# Patient Record
Sex: FEMALE | Race: WHITE | HISPANIC OR LATINO | Employment: OTHER | URBAN - METROPOLITAN AREA
[De-identification: names, ages, dates, MRNs, and addresses within clinical notes are randomized per-mention and may not be internally consistent; named-entity substitution may affect disease eponyms.]

---

## 2017-01-24 ENCOUNTER — HISTORICAL (OUTPATIENT)
Dept: ADMINISTRATIVE | Facility: HOSPITAL | Age: 27
End: 2017-01-24

## 2017-04-12 ENCOUNTER — HISTORICAL (OUTPATIENT)
Dept: ADMINISTRATIVE | Facility: HOSPITAL | Age: 27
End: 2017-04-12

## 2017-06-22 ENCOUNTER — HISTORICAL (OUTPATIENT)
Dept: ADMINISTRATIVE | Facility: HOSPITAL | Age: 27
End: 2017-06-22

## 2017-07-18 ENCOUNTER — HISTORICAL (OUTPATIENT)
Dept: ADMINISTRATIVE | Facility: HOSPITAL | Age: 27
End: 2017-07-18

## 2017-07-18 LAB
ALBUMIN SERPL-MCNC: 3.3 GM/DL (ref 3.4–5)
ALBUMIN/GLOB SERPL: 0.8 {RATIO}
ALP SERPL-CCNC: 70 UNIT/L (ref 38–126)
ALT SERPL-CCNC: 16 UNIT/L (ref 12–78)
AST SERPL-CCNC: 11 UNIT/L (ref 15–37)
BILIRUB SERPL-MCNC: 0.3 MG/DL (ref 0.2–1)
BILIRUBIN DIRECT+TOT PNL SERPL-MCNC: 0.1 MG/DL (ref 0–0.2)
BILIRUBIN DIRECT+TOT PNL SERPL-MCNC: 0.2 MG/DL (ref 0–0.8)
BUN SERPL-MCNC: 7 MG/DL (ref 7–18)
CALCIUM SERPL-MCNC: 8.7 MG/DL (ref 8.5–10.1)
CHLORIDE SERPL-SCNC: 113 MMOL/L (ref 98–107)
CO2 SERPL-SCNC: 26 MMOL/L (ref 21–32)
CREAT SERPL-MCNC: 0.83 MG/DL (ref 0.55–1.02)
ERYTHROCYTE [DISTWIDTH] IN BLOOD BY AUTOMATED COUNT: 15.9 % (ref 11.5–17)
GLOBULIN SER-MCNC: 3.9 GM/DL (ref 2.4–3.5)
GLUCOSE SERPL-MCNC: 85 MG/DL (ref 74–106)
HCT VFR BLD AUTO: 36.1 % (ref 37–47)
HGB BLD-MCNC: 10.4 GM/DL (ref 12–16)
LITHIUM SERPL-MCNC: 0.92 MMOL/L (ref 0.6–1.2)
MCH RBC QN AUTO: 25.2 PG (ref 27–31)
MCHC RBC AUTO-ENTMCNC: 28.8 GM/DL (ref 33–36)
MCV RBC AUTO: 87.6 FL (ref 80–94)
PLATELET # BLD AUTO: 367 X10(3)/MCL (ref 130–400)
PMV BLD AUTO: 10.4 FL (ref 9.4–12.4)
POTASSIUM SERPL-SCNC: 4.3 MMOL/L (ref 3.5–5.1)
PROT SERPL-MCNC: 7.2 GM/DL (ref 6.4–8.2)
RBC # BLD AUTO: 4.12 X10(6)/MCL (ref 4.2–5.4)
SODIUM SERPL-SCNC: 146 MMOL/L (ref 136–145)
TSH SERPL-ACNC: 3.05 MIU/ML (ref 0.36–3.74)
WBC # SPEC AUTO: 8.2 X10(3)/MCL (ref 4.5–11.5)

## 2017-11-16 ENCOUNTER — HISTORICAL (OUTPATIENT)
Dept: ADMINISTRATIVE | Facility: HOSPITAL | Age: 27
End: 2017-11-16

## 2017-11-16 LAB
ABS NEUT (OLG): 4.85 X10(3)/MCL (ref 2.1–9.2)
ALBUMIN SERPL-MCNC: 3.6 GM/DL (ref 3.4–5)
ALBUMIN/GLOB SERPL: 0.9 RATIO (ref 1.1–2)
ALP SERPL-CCNC: 83 UNIT/L (ref 38–126)
ALT SERPL-CCNC: 15 UNIT/L (ref 12–78)
AST SERPL-CCNC: 11 UNIT/L (ref 15–37)
BASOPHILS # BLD AUTO: 0 X10(3)/MCL (ref 0–0.2)
BASOPHILS NFR BLD AUTO: 0 %
BILIRUB SERPL-MCNC: 0.1 MG/DL (ref 0.2–1)
BILIRUBIN DIRECT+TOT PNL SERPL-MCNC: 0 MG/DL (ref 0–0.5)
BILIRUBIN DIRECT+TOT PNL SERPL-MCNC: 0.1 MG/DL (ref 0–0.8)
BUN SERPL-MCNC: 6 MG/DL (ref 7–18)
CALCIUM SERPL-MCNC: 9.2 MG/DL (ref 8.5–10.1)
CHLORIDE SERPL-SCNC: 111 MMOL/L (ref 98–107)
CO2 SERPL-SCNC: 24 MMOL/L (ref 21–32)
CREAT SERPL-MCNC: 0.9 MG/DL (ref 0.55–1.02)
EOSINOPHIL # BLD AUTO: 0.2 X10(3)/MCL (ref 0–0.9)
EOSINOPHIL NFR BLD AUTO: 3 %
ERYTHROCYTE [DISTWIDTH] IN BLOOD BY AUTOMATED COUNT: 15.4 % (ref 11.5–17)
GLOBULIN SER-MCNC: 4.1 GM/DL (ref 2.4–3.5)
GLUCOSE SERPL-MCNC: 127 MG/DL (ref 74–106)
HCT VFR BLD AUTO: 39.2 % (ref 37–47)
HGB BLD-MCNC: 11.5 GM/DL (ref 12–16)
LITHIUM SERPL-MCNC: 0.65 MMOL/L (ref 0.6–1.2)
LYMPHOCYTES # BLD AUTO: 2.2 X10(3)/MCL (ref 0.6–4.6)
LYMPHOCYTES NFR BLD AUTO: 29 %
MCH RBC QN AUTO: 27.1 PG (ref 27–31)
MCHC RBC AUTO-ENTMCNC: 29.3 GM/DL (ref 33–36)
MCV RBC AUTO: 92.2 FL (ref 80–94)
MONOCYTES # BLD AUTO: 0.4 X10(3)/MCL (ref 0.1–1.3)
MONOCYTES NFR BLD AUTO: 5 %
NEUTROPHILS # BLD AUTO: 4.85 X10(3)/MCL (ref 1.4–7.9)
NEUTROPHILS NFR BLD AUTO: 63 %
PLATELET # BLD AUTO: 337 X10(3)/MCL (ref 130–400)
PMV BLD AUTO: 10.1 FL (ref 9.4–12.4)
POTASSIUM SERPL-SCNC: 4.1 MMOL/L (ref 3.5–5.1)
PROT SERPL-MCNC: 7.7 GM/DL (ref 6.4–8.2)
RBC # BLD AUTO: 4.25 X10(6)/MCL (ref 4.2–5.4)
SODIUM SERPL-SCNC: 144 MMOL/L (ref 136–145)
T3RU NFR SERPL: 21 % (ref 31–39)
T4 FREE SERPL-MCNC: 0.8 NG/DL (ref 0.76–1.46)
TSH SERPL-ACNC: 3.42 MIU/ML (ref 0.36–3.74)
WBC # SPEC AUTO: 7.7 X10(3)/MCL (ref 4.5–11.5)

## 2018-06-07 ENCOUNTER — HISTORICAL (OUTPATIENT)
Dept: ADMINISTRATIVE | Facility: HOSPITAL | Age: 28
End: 2018-06-07

## 2018-06-07 LAB
ABS NEUT (OLG): 6.85 X10(3)/MCL (ref 2.1–9.2)
ALBUMIN SERPL-MCNC: 3.7 GM/DL (ref 3.4–5)
ALBUMIN/GLOB SERPL: 0.8 RATIO (ref 1.1–2)
ALP SERPL-CCNC: 64 UNIT/L (ref 38–126)
ALT SERPL-CCNC: 41 UNIT/L (ref 12–78)
AST SERPL-CCNC: 31 UNIT/L (ref 15–37)
BASOPHILS # BLD AUTO: 0 X10(3)/MCL (ref 0–0.2)
BASOPHILS NFR BLD AUTO: 0 %
BILIRUB SERPL-MCNC: 0.4 MG/DL (ref 0.2–1)
BILIRUBIN DIRECT+TOT PNL SERPL-MCNC: 0.1 MG/DL (ref 0–0.5)
BILIRUBIN DIRECT+TOT PNL SERPL-MCNC: 0.3 MG/DL (ref 0–0.8)
BUN SERPL-MCNC: 11 MG/DL (ref 7–18)
CALCIUM SERPL-MCNC: 10.1 MG/DL (ref 8.5–10.1)
CHLORIDE SERPL-SCNC: 108 MMOL/L (ref 98–107)
CO2 SERPL-SCNC: 26 MMOL/L (ref 21–32)
CREAT SERPL-MCNC: 0.91 MG/DL (ref 0.55–1.02)
EOSINOPHIL # BLD AUTO: 0.2 X10(3)/MCL (ref 0–0.9)
EOSINOPHIL NFR BLD AUTO: 2 %
ERYTHROCYTE [DISTWIDTH] IN BLOOD BY AUTOMATED COUNT: 14.2 % (ref 11.5–17)
GLOBULIN SER-MCNC: 4.5 GM/DL (ref 2.4–3.5)
GLUCOSE SERPL-MCNC: 84 MG/DL (ref 74–106)
HCT VFR BLD AUTO: 40.6 % (ref 37–47)
HGB BLD-MCNC: 12 GM/DL (ref 12–16)
LITHIUM SERPL-MCNC: 0.56 MMOL/L (ref 0.6–1.2)
LYMPHOCYTES # BLD AUTO: 2 X10(3)/MCL (ref 0.6–4.6)
LYMPHOCYTES NFR BLD AUTO: 21 %
MCH RBC QN AUTO: 26.7 PG (ref 27–31)
MCHC RBC AUTO-ENTMCNC: 29.6 GM/DL (ref 33–36)
MCV RBC AUTO: 90.4 FL (ref 80–94)
MONOCYTES # BLD AUTO: 0.5 X10(3)/MCL (ref 0.1–1.3)
MONOCYTES NFR BLD AUTO: 5 %
NEUTROPHILS # BLD AUTO: 6.85 X10(3)/MCL (ref 2.1–9.2)
NEUTROPHILS NFR BLD AUTO: 71 %
PLATELET # BLD AUTO: 368 X10(3)/MCL (ref 130–400)
PMV BLD AUTO: 10.1 FL (ref 9.4–12.4)
POTASSIUM SERPL-SCNC: 4.8 MMOL/L (ref 3.5–5.1)
PROT SERPL-MCNC: 8.2 GM/DL (ref 6.4–8.2)
RBC # BLD AUTO: 4.49 X10(6)/MCL (ref 4.2–5.4)
SODIUM SERPL-SCNC: 143 MMOL/L (ref 136–145)
T3RU NFR SERPL: 31 % (ref 31–39)
T4 FREE SERPL-MCNC: 0.95 NG/DL (ref 0.76–1.46)
TSH SERPL-ACNC: 2.4 MIU/L (ref 0.36–3.74)
WBC # SPEC AUTO: 9.6 X10(3)/MCL (ref 4.5–11.5)

## 2020-05-18 ENCOUNTER — TELEPHONE (OUTPATIENT)
Dept: OBSTETRICS AND GYNECOLOGY | Facility: CLINIC | Age: 30
End: 2020-05-18

## 2020-05-18 NOTE — TELEPHONE ENCOUNTER
----- Message from Migdalia Stover sent at 5/18/2020 12:54 PM CDT -----  Contact: pt   Yes, since, she never actually saw him.   ----- Message -----  From: Rex Pinedo  Sent: 5/18/2020  11:25 AM CDT  To: Migdalia Stover    This patient had an appt with Dr. Mcleod on January 14, 2020 at 8:45 that was canceled due to car trouble.      Now she is wanting to see Dr. Shelley.  Her first available would not be until June 1st.    Do I switch providers for her?      ----- Message -----  From: Migdalia Dowson  Sent: 5/18/2020  11:07 AM CDT  To: Rex Pinedo        ----- Message -----  From: Nasima Gong  Sent: 5/18/2020  10:59 AM CDT  To: Karsten Henson ([facility]) Staff    Name of Caller  Pt   Reason for Visit/Symptoms annual and cervical cancer check   Best Contact Number or Confirm if Mychart Preferred phone [phone]  Preferred Date/Time of Appointment 05/27 around 10 am  Interested in Virtual Visit (yes/no)  Additional Information

## 2020-06-09 DIAGNOSIS — Z01.419 ROUTINE GYNECOLOGICAL EXAMINATION: Primary | ICD-10-CM

## 2020-07-06 ENCOUNTER — OFFICE VISIT (OUTPATIENT)
Dept: OBSTETRICS AND GYNECOLOGY | Facility: CLINIC | Age: 30
End: 2020-07-06
Payer: MEDICARE

## 2020-07-06 VITALS
BODY MASS INDEX: 37.56 KG/M2 | SYSTOLIC BLOOD PRESSURE: 138 MMHG | HEIGHT: 63 IN | WEIGHT: 212 LBS | DIASTOLIC BLOOD PRESSURE: 89 MMHG | HEART RATE: 73 BPM

## 2020-07-06 DIAGNOSIS — Z01.419 ROUTINE GYNECOLOGICAL EXAMINATION: Primary | ICD-10-CM

## 2020-07-06 DIAGNOSIS — N64.52 NIPPLE DISCHARGE: ICD-10-CM

## 2020-07-06 DIAGNOSIS — Z01.411 ENCOUNTER FOR GYNECOLOGICAL EXAMINATION (GENERAL) (ROUTINE) WITH ABNORMAL FINDINGS: ICD-10-CM

## 2020-07-06 PROCEDURE — G0101 CA SCREEN;PELVIC/BREAST EXAM: HCPCS | Mod: S$GLB,,, | Performed by: OBSTETRICS & GYNECOLOGY

## 2020-07-06 PROCEDURE — G0101 PR CA SCREEN;PELVIC/BREAST EXAM: ICD-10-PCS | Mod: S$GLB,,, | Performed by: OBSTETRICS & GYNECOLOGY

## 2020-07-06 RX ORDER — ZIPRASIDONE HYDROCHLORIDE 80 MG/1
2 CAPSULE ORAL NIGHTLY
Status: ON HOLD | COMMUNITY
Start: 2020-06-24 | End: 2022-10-20 | Stop reason: HOSPADM

## 2020-07-06 RX ORDER — MOMETASONE FUROATE AND FORMOTEROL FUMARATE DIHYDRATE 200; 5 UG/1; UG/1
AEROSOL RESPIRATORY (INHALATION)
Status: ON HOLD | COMMUNITY
End: 2022-10-20 | Stop reason: HOSPADM

## 2020-07-06 RX ORDER — ESZOPICLONE 3 MG/1
TABLET, FILM COATED ORAL
Status: ON HOLD | COMMUNITY
Start: 2020-06-20 | End: 2022-10-20 | Stop reason: HOSPADM

## 2020-07-06 RX ORDER — TRAMADOL HYDROCHLORIDE 50 MG/1
50 TABLET ORAL
Status: ON HOLD | COMMUNITY
Start: 2020-07-05 | End: 2022-10-17

## 2020-07-06 RX ORDER — ALBUTEROL SULFATE 90 UG/1
AEROSOL, METERED RESPIRATORY (INHALATION)
Status: ON HOLD | COMMUNITY
End: 2022-10-20 | Stop reason: HOSPADM

## 2020-07-07 LAB
CHLAMYDIA: NEGATIVE
GONORRHEA: NEGATIVE
SOURCE: NORMAL
SOURCE: NORMAL
TRICHOMONAS AMPLIFIED: NEGATIVE

## 2020-07-07 NOTE — PROGRESS NOTES
Subjective:       Patient ID: Kim Schroeder is a 30 y.o. female.    Chief Complaint:  Annual exam     History of Present Illness  Pt here for gyn annual.  History and past labs reviewed with patient.    Complaints nipple d/c for approx 2-3 months. Right side only. States its tan/brown in color       Past Medical History:   Diagnosis Date    Anxiety     Bipolar 1 disorder     Depression     PTSD (post-traumatic stress disorder)     Schizophrenia        Past Surgical History:   Procedure Laterality Date     SECTION      CHOLECYSTECTOMY      HYSTERECTOMY      OOPHORECTOMY         OB:  - h/o c-hyst? For previa   Gyn: no STD, never had abn pap   Meds:   Current Outpatient Medications:     traMADoL (ULTRAM) 50 mg tablet, 50 mg., Disp: , Rfl:     albuterol (PROVENTIL/VENTOLIN HFA) 90 mcg/actuation inhaler, albuterol sulfate HFA 90 mcg/actuation aerosol inhaler  INHALE 1 TO 2 PUFFS BY MOUTH EVERY 4 TO 6 HOURS AS NEEDED DIFFICULT BREATHING, Disp: , Rfl:     clonazePAM (KLONOPIN) 2 MG Tab, , Disp: , Rfl:     eszopiclone (LUNESTA) 3 mg Tab, , Disp: , Rfl:     mometasone-formoterol (DULERA) 200-5 mcg/actuation inhaler, Dulera 200 mcg-5 mcg/actuation HFA aerosol inhaler, Disp: , Rfl:     mometasone-formoterol (DULERA) 200-5 mcg/actuation inhaler, Dulera 200 mcg-5 mcg/actuation HFA aerosol inhaler  INHALE 2 PUFFS BY MOUTH TWICE DAILY, Disp: , Rfl:     OXcarbazepine (TRILEPTAL) 600 MG Tab, , Disp: , Rfl:     pravastatin (PRAVACHOL) 40 MG tablet, , Disp: , Rfl:     venlafaxine (EFFEXOR-XR) 150 MG Cp24, , Disp: , Rfl:     venlafaxine (EFFEXOR-XR) 75 MG 24 hr capsule, , Disp: , Rfl:     ziprasidone (GEODON) 80 MG capsule, Take 2 capsules by mouth every evening., Disp: , Rfl:     All: Review of patient's allergies indicates:  No Known Allergies    SH:   Social History     Tobacco Use    Smoking status: Current Some Day Smoker     Types: Cigarettes   Substance Use Topics    Alcohol use: Yes  "    Comment: rarely      FH: family history includes Cervical cancer in her mother; Pancreatic cancer in her maternal grandmother.      Review of Systems  Review of Systems   Constitutional: Negative for chills and fever.   Respiratory: Negative for shortness of breath.    Cardiovascular: Negative for chest pain.   Gastrointestinal: Negative for abdominal pain, blood in stool, constipation, diarrhea, nausea, vomiting and reflux.   Genitourinary: Negative for dysmenorrhea, dyspareunia, dysuria, hematuria, hot flashes, menorrhagia, menstrual problem, pelvic pain, vaginal bleeding, vaginal discharge, postcoital bleeding and vaginal dryness.   Musculoskeletal: Negative for arthralgias and joint swelling.   Integumentary:  Positive for nipple discharge (right side). Negative for rash, hair changes, breast mass and breast skin changes.   Psychiatric/Behavioral: Negative for depression. The patient is not nervous/anxious.    Breast: Positive for nipple discharge (right side).Negative for asymmetry, lump, mass and skin changes          Objective:     Vitals:    07/06/20 0825   BP: 138/89   Pulse: 73   Weight: 96.2 kg (212 lb)   Height: 5' 3" (1.6 m)       Physical Exam:   Constitutional: She appears well-developed and well-nourished. No distress.    HENT:   Head: Normocephalic and atraumatic.    Eyes: Conjunctivae and EOM are normal.    Neck: No tracheal deviation present. No thyromegaly present.    Cardiovascular: Exam reveals no clubbing, no cyanosis and no edema.     Pulmonary/Chest: Effort normal. No respiratory distress.        Abdominal: Soft. She exhibits no distension and no mass. There is no abdominal tenderness. There is no rebound and no guarding. No hernia.     Genitourinary:    Vagina, uterus and rectum normal.      Pelvic exam was performed with patient supine.   There is no rash, tenderness, lesion or injury on the right labia. There is no rash, tenderness, lesion or injury on the left labia. Cervix is " normal. Right adnexum displays no mass, no tenderness and no fullness. Left adnexum displays no mass, no tenderness and no fullness.                Skin: She is not diaphoretic. No cyanosis. Nails show no clubbing.         breast exam wnl- no nipple dc, skin changes, masses or lymph nodes palpated bilaterally    Assessment:        1. Routine gynecological examination    2. Nipple discharge    3. Encounter for gynecological examination (general) (routine) with abnormal findings                Plan:      Annual   Pap today   STD panel   Breast US of R breast   CBC, prolactin, TSH with reflex, LH, FSH   Records from hysterectomy requested - cvx present   Risk assessment for inherited gyn cancer done - neg   RTC  1 year

## 2020-07-17 LAB
HPV 16 RNA: NORMAL
HPV 18/45 RNA: NORMAL
HPV16+18+H RISK 12 DNA CVX-IMP: NORMAL

## 2020-07-28 ENCOUNTER — TELEPHONE (OUTPATIENT)
Dept: OBSTETRICS AND GYNECOLOGY | Facility: CLINIC | Age: 30
End: 2020-07-28

## 2020-07-28 NOTE — TELEPHONE ENCOUNTER
----- Message from Migdalia Stover sent at 7/28/2020  4:11 PM CDT -----  Regarding: FW: result  Contact: [phone]    ----- Message -----  From: Yolanda Curtis  Sent: 7/28/2020   3:01 PM CDT  To: Karsten Henson (Obgyn) Staff  Subject: result                                           Would like to consult with the nurse, patient would like to get her test result, please call back at   .Type:  Test Results    Who Called:  Ms Guardado  Name of Test (Lab/Mammo/Etc):   Date of Test:   Ordering Provider: Dr Shelley  Where the test was performed:   Would the patient rather a call back or a response via [facility]? callback  Best Call Back Number:  [phone]  Additional Information:

## 2020-09-09 ENCOUNTER — PROCEDURE VISIT (OUTPATIENT)
Dept: OBSTETRICS AND GYNECOLOGY | Facility: CLINIC | Age: 30
End: 2020-09-09
Payer: MEDICARE

## 2020-09-09 VITALS
BODY MASS INDEX: 34.02 KG/M2 | HEART RATE: 96 BPM | HEIGHT: 63 IN | DIASTOLIC BLOOD PRESSURE: 85 MMHG | WEIGHT: 192 LBS | SYSTOLIC BLOOD PRESSURE: 124 MMHG

## 2020-09-09 DIAGNOSIS — T74.21XA RAPE OF ADULT, INITIAL ENCOUNTER: ICD-10-CM

## 2020-09-09 DIAGNOSIS — R87.611 PAPANICOLAOU SMEAR OF CERVIX WITH ATYPICAL SQUAMOUS CELLS CANNOT EXCLUDE HIGH GRADE SQUAMOUS INTRAEPITHELIAL LESION (ASC-H): ICD-10-CM

## 2020-09-09 DIAGNOSIS — N89.8 VAGINAL DISCHARGE: Primary | ICD-10-CM

## 2020-09-09 DIAGNOSIS — Z11.3 SCREENING EXAMINATION FOR VENEREAL DISEASE: ICD-10-CM

## 2020-09-09 PROCEDURE — 99499 UNLISTED E&M SERVICE: CPT | Mod: S$GLB,,, | Performed by: OBSTETRICS & GYNECOLOGY

## 2020-09-09 PROCEDURE — 99499 NO LOS: ICD-10-PCS | Mod: S$GLB,,, | Performed by: OBSTETRICS & GYNECOLOGY

## 2020-09-09 PROCEDURE — 57455 BIOPSY OF CERVIX W/SCOPE: CPT | Mod: S$GLB,,, | Performed by: OBSTETRICS & GYNECOLOGY

## 2020-09-09 PROCEDURE — 57455 PR COLPOSCOPY,CERVIX W/ADJ VAGINA,W/BX: ICD-10-PCS | Mod: S$GLB,,, | Performed by: OBSTETRICS & GYNECOLOGY
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
## 2021-04-22 ENCOUNTER — HISTORICAL (OUTPATIENT)
Dept: ADMINISTRATIVE | Facility: HOSPITAL | Age: 31
End: 2021-04-22

## 2021-04-22 LAB — SARS-COV-2 RNA RESP QL NAA+PROBE: NEGATIVE

## 2021-05-12 ENCOUNTER — PATIENT MESSAGE (OUTPATIENT)
Dept: RESEARCH | Facility: HOSPITAL | Age: 31
End: 2021-05-12

## 2021-11-03 ENCOUNTER — HISTORICAL (OUTPATIENT)
Dept: ADMINISTRATIVE | Facility: HOSPITAL | Age: 31
End: 2021-11-03

## 2021-11-03 LAB
B-HCG FREE SERPL-ACNC: 6.4 MIU/ML
BILIRUB SERPL-MCNC: NEGATIVE MG/DL
BLOOD URINE, POC: NEGATIVE
CLARITY, POC UA: CLEAR
COLOR, POC UA: YELLOW
GLUCOSE UR QL STRIP: NEGATIVE
KETONES UR QL STRIP: NEGATIVE
LEUKOCYTE EST, POC UA: NEGATIVE
NITRITE, POC UA: NEGATIVE
PH, POC UA: 7
POC BETA-HCG (QUAL): POSITIVE
POC BETA-HCG (QUAL): POSITIVE
PROTEIN, POC: NEGATIVE
SPECIFIC GRAVITY, POC UA: 1.02
UROBILINOGEN, POC UA: NORMAL

## 2022-01-19 LAB — POC BETA-HCG (QUAL): POSITIVE

## 2022-04-11 ENCOUNTER — HISTORICAL (OUTPATIENT)
Dept: ADMINISTRATIVE | Facility: HOSPITAL | Age: 32
End: 2022-04-11
Payer: MEDICARE

## 2022-04-29 VITALS
SYSTOLIC BLOOD PRESSURE: 130 MMHG | HEIGHT: 63 IN | OXYGEN SATURATION: 98 % | DIASTOLIC BLOOD PRESSURE: 78 MMHG | BODY MASS INDEX: 30.82 KG/M2 | WEIGHT: 173.94 LBS

## 2022-05-09 ENCOUNTER — HISTORICAL (OUTPATIENT)
Dept: ADMINISTRATIVE | Facility: HOSPITAL | Age: 32
End: 2022-05-09
Payer: MEDICARE

## 2022-06-13 ENCOUNTER — HISTORICAL (OUTPATIENT)
Dept: ADMINISTRATIVE | Facility: HOSPITAL | Age: 32
End: 2022-06-13

## 2022-09-22 ENCOUNTER — HISTORICAL (OUTPATIENT)
Dept: ADMINISTRATIVE | Facility: HOSPITAL | Age: 32
End: 2022-09-22
Payer: MEDICARE

## 2022-10-17 ENCOUNTER — HOSPITAL ENCOUNTER (INPATIENT)
Facility: HOSPITAL | Age: 32
LOS: 3 days | Discharge: HOME | DRG: 885 | End: 2022-10-20
Attending: PSYCHIATRY & NEUROLOGY | Admitting: PSYCHIATRY & NEUROLOGY
Payer: MEDICARE

## 2022-10-17 DIAGNOSIS — F32.9 MAJOR DEPRESSION: ICD-10-CM

## 2022-10-17 PROBLEM — Z72.0 TOBACCO ABUSE: Status: ACTIVE | Noted: 2022-10-17

## 2022-10-17 PROBLEM — F31.30 BIPOLAR AFFECTIVE DISORDER, CURRENT EPISODE DEPRESSED: Status: ACTIVE | Noted: 2022-10-17

## 2022-10-17 PROCEDURE — S4991 NICOTINE PATCH NONLEGEND: HCPCS | Performed by: NURSE PRACTITIONER

## 2022-10-17 PROCEDURE — 11400000 HC PSYCH PRIVATE ROOM

## 2022-10-17 PROCEDURE — 25000003 PHARM REV CODE 250: Performed by: NURSE PRACTITIONER

## 2022-10-17 RX ORDER — GABAPENTIN 600 MG/1
600 TABLET ORAL 3 TIMES DAILY
Status: ON HOLD | COMMUNITY
Start: 2022-10-07 | End: 2022-10-20 | Stop reason: HOSPADM

## 2022-10-17 RX ORDER — IBUPROFEN 200 MG
1 TABLET ORAL DAILY
Status: DISCONTINUED | OUTPATIENT
Start: 2022-10-17 | End: 2022-10-20 | Stop reason: HOSPADM

## 2022-10-17 RX ORDER — ALBUTEROL SULFATE 90 UG/1
2 AEROSOL, METERED RESPIRATORY (INHALATION) EVERY 6 HOURS PRN
Status: DISCONTINUED | OUTPATIENT
Start: 2022-10-17 | End: 2022-10-20 | Stop reason: HOSPADM

## 2022-10-17 RX ORDER — ZIPRASIDONE HYDROCHLORIDE 80 MG/1
160 CAPSULE ORAL NIGHTLY
Status: DISCONTINUED | OUTPATIENT
Start: 2022-10-17 | End: 2022-10-20 | Stop reason: HOSPADM

## 2022-10-17 RX ORDER — HYDROXYZINE PAMOATE 50 MG/1
50 CAPSULE ORAL EVERY 6 HOURS PRN
Status: DISCONTINUED | OUTPATIENT
Start: 2022-10-17 | End: 2022-10-20 | Stop reason: HOSPADM

## 2022-10-17 RX ORDER — VENLAFAXINE HYDROCHLORIDE 150 MG/1
300 CAPSULE, EXTENDED RELEASE ORAL DAILY
Status: DISCONTINUED | OUTPATIENT
Start: 2022-10-17 | End: 2022-10-20 | Stop reason: HOSPADM

## 2022-10-17 RX ORDER — QUETIAPINE FUMARATE 100 MG/1
100 TABLET, FILM COATED ORAL NIGHTLY
Status: ON HOLD | COMMUNITY
Start: 2022-09-26 | End: 2022-10-17

## 2022-10-17 RX ORDER — IBUPROFEN 200 MG
1 TABLET ORAL DAILY
Status: DISCONTINUED | OUTPATIENT
Start: 2022-10-17 | End: 2022-10-17

## 2022-10-17 RX ORDER — CLONAZEPAM 1 MG/1
1 TABLET ORAL 2 TIMES DAILY
Status: DISCONTINUED | OUTPATIENT
Start: 2022-10-17 | End: 2022-10-20 | Stop reason: HOSPADM

## 2022-10-17 RX ORDER — SUMATRIPTAN SUCCINATE 100 MG
100 TABLET ORAL 2 TIMES DAILY PRN
Status: ON HOLD | COMMUNITY
Start: 2022-10-07 | End: 2022-10-20 | Stop reason: HOSPADM

## 2022-10-17 RX ORDER — SUMATRIPTAN 50 MG/1
100 TABLET, FILM COATED ORAL 2 TIMES DAILY PRN
Status: DISCONTINUED | OUTPATIENT
Start: 2022-10-17 | End: 2022-10-20 | Stop reason: HOSPADM

## 2022-10-17 RX ORDER — PROPRANOLOL HYDROCHLORIDE 20 MG/1
40 TABLET ORAL 2 TIMES DAILY
Status: DISCONTINUED | OUTPATIENT
Start: 2022-10-17 | End: 2022-10-18

## 2022-10-17 RX ORDER — ACETAMINOPHEN 325 MG/1
650 TABLET ORAL EVERY 6 HOURS PRN
Status: DISCONTINUED | OUTPATIENT
Start: 2022-10-17 | End: 2022-10-20 | Stop reason: HOSPADM

## 2022-10-17 RX ORDER — TRAZODONE HYDROCHLORIDE 150 MG/1
150 TABLET ORAL NIGHTLY
Status: DISCONTINUED | OUTPATIENT
Start: 2022-10-17 | End: 2022-10-18

## 2022-10-17 RX ORDER — ADHESIVE BANDAGE
30 BANDAGE TOPICAL DAILY PRN
Status: DISCONTINUED | OUTPATIENT
Start: 2022-10-17 | End: 2022-10-20 | Stop reason: HOSPADM

## 2022-10-17 RX ORDER — GABAPENTIN 300 MG/1
600 CAPSULE ORAL 3 TIMES DAILY
Status: DISCONTINUED | OUTPATIENT
Start: 2022-10-17 | End: 2022-10-20 | Stop reason: HOSPADM

## 2022-10-17 RX ORDER — MUPIROCIN 20 MG/G
OINTMENT TOPICAL 2 TIMES DAILY
Status: DISCONTINUED | OUTPATIENT
Start: 2022-10-17 | End: 2022-10-17

## 2022-10-17 RX ORDER — PROPRANOLOL HYDROCHLORIDE 40 MG/1
40 TABLET ORAL 2 TIMES DAILY
Status: ON HOLD | COMMUNITY
Start: 2022-10-07 | End: 2022-10-20 | Stop reason: HOSPADM

## 2022-10-17 RX ORDER — IBUPROFEN 400 MG/1
400 TABLET ORAL EVERY 6 HOURS PRN
Status: DISCONTINUED | OUTPATIENT
Start: 2022-10-17 | End: 2022-10-20 | Stop reason: HOSPADM

## 2022-10-17 RX ORDER — MAG HYDROX/ALUMINUM HYD/SIMETH 200-200-20
30 SUSPENSION, ORAL (FINAL DOSE FORM) ORAL EVERY 6 HOURS PRN
Status: DISCONTINUED | OUTPATIENT
Start: 2022-10-17 | End: 2022-10-20 | Stop reason: HOSPADM

## 2022-10-17 RX ADMIN — VENLAFAXINE HYDROCHLORIDE 300 MG: 150 CAPSULE, EXTENDED RELEASE ORAL at 10:10

## 2022-10-17 RX ADMIN — ZIPRASIDONE HCL 160 MG: 80 CAPSULE ORAL at 08:10

## 2022-10-17 RX ADMIN — NICOTINE 1 PATCH: 21 PATCH, EXTENDED RELEASE TRANSDERMAL at 10:10

## 2022-10-17 RX ADMIN — PROPRANOLOL HYDROCHLORIDE 40 MG: 20 TABLET ORAL at 10:10

## 2022-10-17 RX ADMIN — CLONAZEPAM 1 MG: 1 TABLET ORAL at 08:10

## 2022-10-17 RX ADMIN — GABAPENTIN 600 MG: 300 CAPSULE ORAL at 08:10

## 2022-10-17 RX ADMIN — CLONAZEPAM 1 MG: 1 TABLET ORAL at 10:10

## 2022-10-17 RX ADMIN — GABAPENTIN 600 MG: 300 CAPSULE ORAL at 02:10

## 2022-10-17 RX ADMIN — TRAZODONE HYDROCHLORIDE 150 MG: 150 TABLET ORAL at 08:10

## 2022-10-17 RX ADMIN — PROPRANOLOL HYDROCHLORIDE 40 MG: 20 TABLET ORAL at 08:10
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
## 2022-10-17 NOTE — ASSESSMENT & PLAN NOTE
Patient has persistent depression which is moderate and is currently uncontrolled. Will Continue anti-depressant medications. We will consult psychiatry at this time. Patient does not display psychosis at this time. Continue to monitor closely and adjust plan of care as needed.    Admit to [facility]  OOB  ambulate  Review home meds  Follow as needed

## 2022-10-17 NOTE — NURSING
"A 33 y/o female admitted to the services of Dr. Gamble from [facility] ED. She was admitted with a provisional diagnosis of Bipolar D/O. She arrived via [facility] per stretcher. Kim had been OPC'd by her  for what he described as an attempt to OD. She denies attempting to OD, "I put the pills in my mouth and spit them out into a cup" Denies SI, "I was trying to get his attention". "I'm bored". Denies hallucinations, does admit to paranoia. Last inpatient hospitalization was at [facility] around ten years ago. She denies past SA, admits to "trying cutting around 8 years ago". No past history of abuse. Denies ETOH use. Denies drug use, has a prescription for medical marijuana. UDS was positive for THC, amphetamines and barbiturates. She has prescriptions for Adderal and Imatrex. She admits to being noncompliant with medications for a few days. Appetite is good, she has no issues with sleep.    Kim is  with three school age children. She in currently working towards a business degree online. Medically she has migraines, nerve pain and UTI. She was given Rocephin IM in ED. She's had 3 c-sections, a partial hysterectomy and cholecystectomy. She is allergic to Latex. She was scanned prior to admit on unit, no sharps or contraband found on body assessment. She was oriented to unit, room and schedules. H&P done by Dr. Kat. Dr Murray notified for CEC. Q 15 min safety checks with suicide precautions. Will monitor mood and behavior and offer emotional support.    John- 22  FRA- 53  MMSE- 30  MDI- 36  V/S: 100/63  88  18  99%  98.8      "

## 2022-10-17 NOTE — HPI
31 yo female admitted to [facility] today for depression.  Hospitalist have been consulted for medical evaluation and clearance.  She currently voices no complaints.  No N/V/D/C.  No fever/chills.  No chest pain/SOB.  She does suffer from migraine headaches.

## 2022-10-17 NOTE — SUBJECTIVE & OBJECTIVE
Past Medical History:   Diagnosis Date    Anxiety     Bipolar 1 disorder     Depression     PTSD (post-traumatic stress disorder)     Schizophrenia        Past Surgical History:   Procedure Laterality Date     SECTION      CHOLECYSTECTOMY      HYSTERECTOMY      OOPHORECTOMY         Review of patient's allergies indicates:  No Known Allergies    No current facility-administered medications on file prior to encounter.     Current Outpatient Medications on File Prior to Encounter   Medication Sig    albuterol (PROVENTIL/VENTOLIN HFA) 90 mcg/actuation inhaler albuterol sulfate HFA 90 mcg/actuation aerosol inhaler   INHALE 1 TO 2 PUFFS BY MOUTH EVERY 4 TO 6 HOURS AS NEEDED DIFFICULT BREATHING    clonazePAM (KLONOPIN) 2 MG Tab Take 2 mg by mouth 2 (two) times daily.    gabapentin (NEURONTIN) 600 MG tablet Take 600 mg by mouth 3 (three) times daily.    IMITREX 100 mg tablet Take 100 mg by mouth 2 (two) times daily as needed.    propranoloL (INDERAL) 40 MG tablet Take 40 mg by mouth 2 (two) times daily.    venlafaxine (EFFEXOR-XR) 150 MG Cp24 300 mg once daily.    ziprasidone (GEODON) 80 MG capsule Take 2 capsules by mouth every evening.    [DISCONTINUED] OXcarbazepine (TRILEPTAL) 600 MG Tab Take 600 mg by mouth 2 (two) times daily.    [DISCONTINUED] QUEtiapine (SEROQUEL) 100 MG Tab Take 100 mg by mouth every evening.    eszopiclone (LUNESTA) 3 mg Tab     mometasone-formoterol (DULERA) 200-5 mcg/actuation inhaler Dulera 200 mcg-5 mcg/actuation HFA aerosol inhaler   INHALE 2 PUFFS BY MOUTH TWICE DAILY    [DISCONTINUED] mometasone-formoterol (DULERA) 200-5 mcg/actuation inhaler Dulera 200 mcg-5 mcg/actuation HFA aerosol inhaler    [DISCONTINUED] pravastatin (PRAVACHOL) 40 MG tablet     [DISCONTINUED] traMADoL (ULTRAM) 50 mg tablet 50 mg.    [DISCONTINUED] venlafaxine (EFFEXOR-XR) 75 MG 24 hr capsule      Family History       Problem Relation (Age of Onset)    Cervical cancer Mother    Pancreatic cancer Maternal  Grandmother          Tobacco Use    Smoking status: Some Days     Types: Cigarettes    Smokeless tobacco: Not on file   Substance and Sexual Activity    Alcohol use: Yes     Comment: rarely    Drug use: Yes     Types: Marijuana    Sexual activity: Yes     Partners: Male     Review of Systems   Constitutional: Negative.    HENT: Negative.     Eyes: Negative.    Respiratory: Negative.     Cardiovascular: Negative.    Gastrointestinal: Negative.    Endocrine: Negative.    Genitourinary: Negative.    Musculoskeletal: Negative.    Skin: Negative.    Allergic/Immunologic: Negative.    Neurological: Negative.    Hematological: Negative.    Psychiatric/Behavioral:  Positive for dysphoric mood.    Objective:     Vital Signs (Most Recent):  Temp: 98.3 °F (36.8 °C) (10/17/22 0715)  Pulse: 88 (10/17/22 0715)  Resp: 18 (10/17/22 0715)  BP: 100/63 (10/17/22 0715)  SpO2: 99 % (10/17/22 0715) Vital Signs (24h Range):  Temp:  [98.3 °F (36.8 °C)] 98.3 °F (36.8 °C)  Pulse:  [88] 88  Resp:  [18] 18  SpO2:  [99 %] 99 %  BP: (100)/(63) 100/63     Weight: 92.6 kg (204 lb 2.3 oz)  Body mass index is 36.16 kg/m².    Physical Exam  Constitutional:       Appearance: Normal appearance. She is obese.   HENT:      Head: Normocephalic and atraumatic.      Nose: Nose normal.      Mouth/Throat:      Mouth: Mucous membranes are moist.      Pharynx: Oropharynx is clear.   Eyes:      Extraocular Movements: Extraocular movements intact.      Conjunctiva/sclera: Conjunctivae normal.      Pupils: Pupils are equal, round, and reactive to light.   Cardiovascular:      Rate and Rhythm: Normal rate and regular rhythm.      Pulses: Normal pulses.      Heart sounds: Normal heart sounds.   Pulmonary:      Effort: Pulmonary effort is normal.      Breath sounds: Normal breath sounds.   Abdominal:      General: Bowel sounds are normal.      Palpations: Abdomen is soft.   Musculoskeletal:         General: Normal range of motion.      Cervical back: Normal range of  motion and neck supple.   Skin:     General: Skin is warm and dry.      Capillary Refill: Capillary refill takes 2 to 3 seconds.   Neurological:      General: No focal deficit present.      Mental Status: She is alert and oriented to person, place, and time. Mental status is at baseline.   Psychiatric:         Mood and Affect: Mood normal.         Behavior: Behavior normal.         Thought Content: Thought content normal.         Judgment: Judgment normal.       Significant Labs: All pertinent labs within the past 24 hours have been reviewed.  BMP: No results for input(s): GLU, NA, K, CL, CO2, BUN, CREATININE, CALCIUM, MG in the last 48 hours.  CBC: No results for input(s): WBC, HGB, HCT, PLT in the last 48 hours.  CMP: No results for input(s): NA, K, CL, CO2, GLU, BUN, CREATININE, CALCIUM, PROT, ALBUMIN, BILITOT, ALKPHOS, AST, ALT, ANIONGAP, EGFRNONAA in the last 48 hours.    Invalid input(s): ESTGFAFRICA  Magnesium: No results for input(s): MG in the last 48 hours.    Significant Imaging: I have reviewed all pertinent imaging results/findings within the past 24 hours.

## 2022-10-17 NOTE — HOSPITAL COURSE
"She was admitted to the psych unit and monitored for safety. Her meds were reconciled and adjusted. Klonopin was decreased, seroquel and trileptal were stopped and trazodone was started. She was provided with therapy.    10/18/22-She was admitted for reportedly attempting to swallow pills in a suicide attempt. She sees a NP for psych meds and is on Klonopin, Gabapentin, Adderall and reportedly gets medical marijuana. Her overdose risk score is 740 and risk of her prescribed meds was reviewed with patient. "It was a joke that went real bad, he thought I did take them." She says she pretended to take a handful of pills as a joke and her  did not realize it was a joke. Says she spit the pills back into the cup. She denies feeling depressed and has poor insight. She admits to stress caring for 3 children and her  who is on disability.  is not talking to her currently but she says she can return home after discharge. She denies si/hi. Denies psychosis. Tolerating meds currently and on a lower dose of Klonopin. BP has been running low and she is also on propranolol. Slept a lot here last night and tired today. Will decrease trazodone and propranolol due to fatigue and low BP and monitor closely on meds. Encourage group attendence.    10/19/22- told her he left and took the kids and was going to get a restraining on her.  has not been answering the phone. She was emotional about this yesterday. She isolates in her room. She wants to go home but is not coping well with her stressors. She has poor impulse control and takes little responsibilities for her actions. Slept well last night and was sluggish this morning. "I sleep all day" and does not attend groups. She is resistant to care and minimizes her problems. No side effects notes but her BP has been low, systolic BP in the 90s. No withdrawals. Discontinue propranolol due to low BP as it was given for migraines. Monitor on other meds " "despite polypharmacy. Will not give klonopin script upon discharge given she was prescribed it at home and filled 2mg pills on 9/23/22.    10/20/22-She has done well with the decrease in medications. She no longer feels like "a zombie" and reports feeling much better. She denies any suicidal or homicidal ideations. She has handled the stressors of her situation without acting out and reports that her mindset is better and that she is much more hopeful. I did have the therapist speak with her  to inform him that the medication combination may have had something to do with her erratic emotions as it was an excessive amount. He was thankful for the information. She still is planning to go to her mother's home and to give her  any space that he may need. If he does want her to return home she will gladly do so. We will discharge today with all follow up recommendations in place.   "
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
## 2022-10-17 NOTE — PSYCH
MULTIDISCILINARY TEAM      ______________________________________________________________________  Psychiatrist Signature           Print Name    Credentials    Date/Time                    _______________________________________________________________________  Registered Nurse Signature         Print Name    Credentials    Date/Time                  _______________________________________________________________________   Signature           Print Name    Credentials    Date/Time         _______________________________________________________________________  Utilization Review Coordinator       Print Name    Credentials    Date/Time                  ESTIMATED LOS: __________      I have reviewed my treatment plan with staff and have signed the Patient Responsibilities form.      ______________________________________________________________________  Patient Signature   Print Name   Date/Time
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
## 2022-10-17 NOTE — CONSULTS
[facility] Hospital Medicine  Consult Note    Patient Name: Kim Schroeder  MRN: [id number]  Admission Date: 10/17/2022  Hospital Length of Stay: 0 days  Attending Physician: Blayne Gamble MD   Primary Care Provider: Roney Marcelino MD           Patient information was obtained from patient and ER records.     Consults  Subjective:     Principal Problem: Bipolar affective disorder, current episode depressed    Chief Complaint: depression     HPI: 33 yo female admitted to [facility] today for depression.  Hospitalist have been consulted for medical evaluation and clearance.  She currently voices no complaints.  No N/V/D/C.  No fever/chills.  No chest pain/SOB.  She does suffer from migraine headaches.        Past Medical History:   Diagnosis Date    Anxiety     Bipolar 1 disorder     Depression     PTSD (post-traumatic stress disorder)     Schizophrenia        Past Surgical History:   Procedure Laterality Date     SECTION      CHOLECYSTECTOMY      HYSTERECTOMY      OOPHORECTOMY         Review of patient's allergies indicates:  No Known Allergies    No current facility-administered medications on file prior to encounter.     Current Outpatient Medications on File Prior to Encounter   Medication Sig    albuterol (PROVENTIL/VENTOLIN HFA) 90 mcg/actuation inhaler albuterol sulfate HFA 90 mcg/actuation aerosol inhaler   INHALE 1 TO 2 PUFFS BY MOUTH EVERY 4 TO 6 HOURS AS NEEDED DIFFICULT BREATHING    clonazePAM (KLONOPIN) 2 MG Tab Take 2 mg by mouth 2 (two) times daily.    gabapentin (NEURONTIN) 600 MG tablet Take 600 mg by mouth 3 (three) times daily.    IMITREX 100 mg tablet Take 100 mg by mouth 2 (two) times daily as needed.    propranoloL (INDERAL) 40 MG tablet Take 40 mg by mouth 2 (two) times daily.    venlafaxine (EFFEXOR-XR) 150 MG Cp24 300 mg once daily.    ziprasidone (GEODON) 80 MG capsule Take 2 capsules by mouth every evening.    [DISCONTINUED]  OXcarbazepine (TRILEPTAL) 600 MG Tab Take 600 mg by mouth 2 (two) times daily.    [DISCONTINUED] QUEtiapine (SEROQUEL) 100 MG Tab Take 100 mg by mouth every evening.    eszopiclone (LUNESTA) 3 mg Tab     mometasone-formoterol (DULERA) 200-5 mcg/actuation inhaler Dulera 200 mcg-5 mcg/actuation HFA aerosol inhaler   INHALE 2 PUFFS BY MOUTH TWICE DAILY    [DISCONTINUED] mometasone-formoterol (DULERA) 200-5 mcg/actuation inhaler Dulera 200 mcg-5 mcg/actuation HFA aerosol inhaler    [DISCONTINUED] pravastatin (PRAVACHOL) 40 MG tablet     [DISCONTINUED] traMADoL (ULTRAM) 50 mg tablet 50 mg.    [DISCONTINUED] venlafaxine (EFFEXOR-XR) 75 MG 24 hr capsule      Family History       Problem Relation (Age of Onset)    Cervical cancer Mother    Pancreatic cancer Maternal Grandmother          Tobacco Use    Smoking status: Some Days     Types: Cigarettes    Smokeless tobacco: Not on file   Substance and Sexual Activity    Alcohol use: Yes     Comment: rarely    Drug use: Yes     Types: Marijuana    Sexual activity: Yes     Partners: Male     Review of Systems   Constitutional: Negative.    HENT: Negative.     Eyes: Negative.    Respiratory: Negative.     Cardiovascular: Negative.    Gastrointestinal: Negative.    Endocrine: Negative.    Genitourinary: Negative.    Musculoskeletal: Negative.    Skin: Negative.    Allergic/Immunologic: Negative.    Neurological: Negative.    Hematological: Negative.    Psychiatric/Behavioral:  Positive for dysphoric mood.    Objective:     Vital Signs (Most Recent):  Temp: 98.3 °F (36.8 °C) (10/17/22 0715)  Pulse: 88 (10/17/22 0715)  Resp: 18 (10/17/22 0715)  BP: 100/63 (10/17/22 0715)  SpO2: 99 % (10/17/22 0715) Vital Signs (24h Range):  Temp:  [98.3 °F (36.8 °C)] 98.3 °F (36.8 °C)  Pulse:  [88] 88  Resp:  [18] 18  SpO2:  [99 %] 99 %  BP: (100)/(63) 100/63     Weight: 92.6 kg (204 lb 2.3 oz)  Body mass index is 36.16 kg/m².    Physical Exam  Constitutional:       Appearance: Normal  appearance. She is obese.   HENT:      Head: Normocephalic and atraumatic.      Nose: Nose normal.      Mouth/Throat:      Mouth: Mucous membranes are moist.      Pharynx: Oropharynx is clear.   Eyes:      Extraocular Movements: Extraocular movements intact.      Conjunctiva/sclera: Conjunctivae normal.      Pupils: Pupils are equal, round, and reactive to light.   Cardiovascular:      Rate and Rhythm: Normal rate and regular rhythm.      Pulses: Normal pulses.      Heart sounds: Normal heart sounds.   Pulmonary:      Effort: Pulmonary effort is normal.      Breath sounds: Normal breath sounds.   Abdominal:      General: Bowel sounds are normal.      Palpations: Abdomen is soft.   Musculoskeletal:         General: Normal range of motion.      Cervical back: Normal range of motion and neck supple.   Skin:     General: Skin is warm and dry.      Capillary Refill: Capillary refill takes 2 to 3 seconds.   Neurological:      General: No focal deficit present.      Mental Status: She is alert and oriented to person, place, and time. Mental status is at baseline.   Psychiatric:         Mood and Affect: Mood normal.         Behavior: Behavior normal.         Thought Content: Thought content normal.         Judgment: Judgment normal.       Significant Labs: All pertinent labs within the past 24 hours have been reviewed.  BMP: No results for input(s): GLU, NA, K, CL, CO2, BUN, CREATININE, CALCIUM, MG in the last 48 hours.  CBC: No results for input(s): WBC, HGB, HCT, PLT in the last 48 hours.  CMP: No results for input(s): NA, K, CL, CO2, GLU, BUN, CREATININE, CALCIUM, PROT, ALBUMIN, BILITOT, ALKPHOS, AST, ALT, ANIONGAP, EGFRNONAA in the last 48 hours.    Invalid input(s): ESTGFAFRICA  Magnesium: No results for input(s): MG in the last 48 hours.    Significant Imaging: I have reviewed all pertinent imaging results/findings within the past 24 hours.    Assessment/Plan:     * Bipolar affective disorder, current episode  depressed  Patient has persistent depression which is moderate and is currently uncontrolled. Will Continue anti-depressant medications. We will consult psychiatry at this time. Patient does not display psychosis at this time. Continue to monitor closely and adjust plan of care as needed.    Admit to [facility]  OOB  ambulate  Review home meds  Follow as needed      Tobacco abuse  Advised to stop smoking and vaping.  Can offer nicotine patch.  She is not interested in stopping at this time(cessation=4mins)        VTE Risk Mitigation (From admission, onward)    None              Thank you for your consult. I will sign off. Please contact us if you have any additional questions.    Josue Kat MD  Department of Hospital Medicine   [facility]
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
## 2022-10-17 NOTE — ASSESSMENT & PLAN NOTE
Advised to stop smoking and vaping.  Can offer nicotine patch.  She is not interested in stopping at this time(cessation=4mins)

## 2022-10-18 LAB
CHOLEST SERPL-MCNC: 253 MG/DL
CHOLEST/HDLC SERPL: 4 {RATIO} (ref 0–5)
GLUCOSE P FAST SERPL-MCNC: 92 MG/DL (ref 74–100)
HDLC SERPL-MCNC: 64 MG/DL (ref 35–60)
LDLC SERPL CALC-MCNC: 162 MG/DL (ref 50–140)
T PALLIDUM AB SER QL: NONREACTIVE
TRIGL SERPL-MCNC: 135 MG/DL (ref 37–140)
VLDLC SERPL CALC-MCNC: 27 MG/DL

## 2022-10-18 PROCEDURE — 11400000 HC PSYCH PRIVATE ROOM

## 2022-10-18 PROCEDURE — 80061 LIPID PANEL: CPT | Performed by: NURSE PRACTITIONER

## 2022-10-18 PROCEDURE — 25000003 PHARM REV CODE 250: Performed by: PSYCHIATRY & NEUROLOGY

## 2022-10-18 PROCEDURE — 86780 TREPONEMA PALLIDUM: CPT | Performed by: NURSE PRACTITIONER

## 2022-10-18 PROCEDURE — 82947 ASSAY GLUCOSE BLOOD QUANT: CPT | Performed by: NURSE PRACTITIONER

## 2022-10-18 PROCEDURE — 25000003 PHARM REV CODE 250: Performed by: NURSE PRACTITIONER

## 2022-10-18 PROCEDURE — S4991 NICOTINE PATCH NONLEGEND: HCPCS | Performed by: NURSE PRACTITIONER

## 2022-10-18 PROCEDURE — 36415 COLL VENOUS BLD VENIPUNCTURE: CPT | Performed by: NURSE PRACTITIONER

## 2022-10-18 RX ORDER — TRAZODONE HYDROCHLORIDE 50 MG/1
100 TABLET ORAL NIGHTLY
Status: DISCONTINUED | OUTPATIENT
Start: 2022-10-18 | End: 2022-10-20 | Stop reason: HOSPADM

## 2022-10-18 RX ORDER — PROPRANOLOL HYDROCHLORIDE 20 MG/1
20 TABLET ORAL 2 TIMES DAILY
Status: DISCONTINUED | OUTPATIENT
Start: 2022-10-18 | End: 2022-10-19

## 2022-10-18 RX ADMIN — HYDROXYZINE PAMOATE 50 MG: 50 CAPSULE ORAL at 06:10

## 2022-10-18 RX ADMIN — GABAPENTIN 600 MG: 300 CAPSULE ORAL at 08:10

## 2022-10-18 RX ADMIN — CLONAZEPAM 1 MG: 1 TABLET ORAL at 08:10

## 2022-10-18 RX ADMIN — ZIPRASIDONE HCL 160 MG: 80 CAPSULE ORAL at 08:10

## 2022-10-18 RX ADMIN — VENLAFAXINE HYDROCHLORIDE 300 MG: 150 CAPSULE, EXTENDED RELEASE ORAL at 08:10

## 2022-10-18 RX ADMIN — NICOTINE 1 PATCH: 21 PATCH, EXTENDED RELEASE TRANSDERMAL at 08:10

## 2022-10-18 RX ADMIN — CLONAZEPAM 1 MG: 1 TABLET ORAL at 12:10

## 2022-10-18 RX ADMIN — PROPRANOLOL HYDROCHLORIDE 20 MG: 20 TABLET ORAL at 08:10

## 2022-10-18 RX ADMIN — PROPRANOLOL HYDROCHLORIDE 40 MG: 20 TABLET ORAL at 08:10

## 2022-10-18 RX ADMIN — GABAPENTIN 600 MG: 300 CAPSULE ORAL at 03:10

## 2022-10-18 RX ADMIN — TRAZODONE HYDROCHLORIDE 100 MG: 50 TABLET ORAL at 08:10

## 2022-10-18 NOTE — NURSING
Kim denies SI and contracts for safety. (+) anxiety and depression. She is preoccupied with discharge. Can be restless at times. (+) paranoia. She denies AVH. Appearance is unkempt. Admits fear that her  is trying to take her kids from her. Tearful episodes. Blaming others. Poor judgement and insight. Poor concentration. Encouragement given. (+) coping skills discussed. Education provided. Q15 observations maintained for safety and precaution. Will continue to monitor closely.    Price (Use Numbers Only, No Special Characters Or $): 100

## 2022-10-18 NOTE — NURSING
"Awake alert and oriented. Slept well last night. Pt now saying "it was joke that went real bad". Denies SI or ever having had an attempt in the past.  Denies hallucinations. Pt did not speak to her  or children since admission. Met with dr Gamble for tx team, discussed medications. Propranolol decreased to 20mg bid and Trazadone decreased to 100mg q hs. Q 15 min safety checks with suicide precautions. Will monitor mood and behavior and offer emotional support.  " Airway patent, Nasal mucosa clear. Mouth with normal mucosa. Throat has no vesicles, no oropharyngeal exudates and uvula is midline.

## 2022-10-18 NOTE — NURSING
Kim spoke with her  on the phone this evening and he informed her that he was leaving her with the kids and has filed a protective order against her. Pt was obviously upset, wanting to be discharged. Explained to her that she would have to address it with the Dr tomorrow. 1:1 emotional support given. Vistaril 50mg po given for anxiety. I notified her mother, Mary Goodwin and transferred her to patient line to speak with patient.

## 2022-10-18 NOTE — SUBJECTIVE & OBJECTIVE
Seen for treatment team.    Psychiatric Mental Status Exam:  General Appearance: appears stated age, well-developed, well-nourished  Arousal: alert  Behavior: cooperative  Movements and Motor Activity: no abnormal involuntary movements noted  Orientation: oriented to person, place, time, and situation  Speech: normal rate, normal rhythm, normal volume, normal tone  Mood: tired  Affect: mood-congruent  Thought Process: linear  Associations: intact  Thought Content and Perceptions: no suicidal ideation, no homicidal ideation, no auditory hallucinations, no visual hallucinations, no paranoid ideation, no ideas of reference, no evidence of delusions or psychosis  Recent and Remote Memory: recent memory intact, remote memory intact  Attention and Concentration: intact, attentive to conversation  Fund of Knowledge: intact, aware of current events, vocabulary appropriate  Insight: impaired  Judgment: impaired      Family History       Problem Relation (Age of Onset)    Cervical cancer Mother    Pancreatic cancer Maternal Grandmother          Tobacco Use    Smoking status: Some Days     Types: Cigarettes    Smokeless tobacco: Not on file   Substance and Sexual Activity    Alcohol use: Yes     Comment: rarely    Drug use: Yes     Types: Marijuana    Sexual activity: Yes     Partners: Male     Psychotherapeutics (From admission, onward)      Start     Stop Route Frequency Ordered    10/17/22 2100  ziprasidone capsule 160 mg         -- Oral Nightly 10/17/22 1037    10/17/22 2100  traZODone tablet 150 mg         -- Oral Nightly 10/17/22 1039    10/17/22 1145  venlafaxine 24 hr capsule 300 mg         -- Oral Daily 10/17/22 1037             Review of Systems  Objective:     Vital Signs (Most Recent):  Temp: 97.4 °F (36.3 °C) (10/18/22 0604)  Pulse: 64 (10/18/22 0810)  Resp: 18 (10/18/22 0604)  BP: 114/73 (10/18/22 0810)  SpO2: 97 % (10/18/22 0810) Vital Signs (24h Range):  Temp:  [97.4 °F (36.3 °C)] 97.4 °F (36.3 °C)  Pulse:   "[64] 64  Resp:  [18] 18  SpO2:  [96 %-97 %] 97 %  BP: ()/(69-73) 114/73     Height: 5' 3" (160 cm)  Weight: 92.6 kg (204 lb 2.3 oz)  Body mass index is 36.16 kg/m².    No intake or output data in the 24 hours ending 10/18/22 0821    Physical Exam     Significant Labs: Last 72 Hours:   Recent Lab Results         10/18/22  0425        Cholesterol 253       Gluc Fast 92       HDL 64       LDL Cholesterol External 162.00       Total Cholesterol/HDL Ratio 4       Triglycerides 135       Very Low Density Lipoprotein 27               Significant Imaging: I have reviewed all pertinent imaging results/findings within the past 24 hours.  "

## 2022-10-18 NOTE — NURSING
Pt informed MHT at supper that her dentures were missing. She said that she wrapped them in a paper towel last night and placed them on her closet shelf. This was the first time today that she mentioned they were missing. The night staff was notified and confirmed that the only trash thrown out the room during room check was an empty cup. There was no trash removed by MHT after this morning's room check.

## 2022-10-19 PROCEDURE — 25000003 PHARM REV CODE 250: Performed by: PSYCHIATRY & NEUROLOGY

## 2022-10-19 PROCEDURE — 25000003 PHARM REV CODE 250: Performed by: NURSE PRACTITIONER

## 2022-10-19 PROCEDURE — S4991 NICOTINE PATCH NONLEGEND: HCPCS | Performed by: NURSE PRACTITIONER

## 2022-10-19 PROCEDURE — 11400000 HC PSYCH PRIVATE ROOM

## 2022-10-19 RX ADMIN — GABAPENTIN 600 MG: 300 CAPSULE ORAL at 02:10

## 2022-10-19 RX ADMIN — CLONAZEPAM 1 MG: 1 TABLET ORAL at 01:10

## 2022-10-19 RX ADMIN — VENLAFAXINE HYDROCHLORIDE 300 MG: 150 CAPSULE, EXTENDED RELEASE ORAL at 08:10

## 2022-10-19 RX ADMIN — GABAPENTIN 600 MG: 300 CAPSULE ORAL at 08:10

## 2022-10-19 RX ADMIN — CLONAZEPAM 1 MG: 1 TABLET ORAL at 08:10

## 2022-10-19 RX ADMIN — NICOTINE 1 PATCH: 21 PATCH, EXTENDED RELEASE TRANSDERMAL at 08:10

## 2022-10-19 RX ADMIN — TRAZODONE HYDROCHLORIDE 100 MG: 50 TABLET ORAL at 08:10

## 2022-10-19 RX ADMIN — ZIPRASIDONE HCL 160 MG: 80 CAPSULE ORAL at 08:10

## 2022-10-19 RX ADMIN — PROPRANOLOL HYDROCHLORIDE 20 MG: 20 TABLET ORAL at 08:10

## 2022-10-19 NOTE — PROGRESS NOTES
Pt will be discharged to home and follow up with andrey Rose NP in [place] on 10/24/22.  Pt was cooperative with programming.  Pt denies HI/SI and psychosis.  All referrals and d/c orders have been faxed to provider.

## 2022-10-19 NOTE — SUBJECTIVE & OBJECTIVE
Patient was seen via video telemedicine and consented to the interview. The patient was located in [place], [place] and the provider was located in [place], [place].    Psychiatric Mental Status Exam:  General Appearance: appears stated age, well-developed, well-nourished  Arousal: alert  Behavior: cooperative  Movements and Motor Activity: no abnormal involuntary movements noted  Orientation: oriented to person, place, time, and situation  Speech: normal rate, normal rhythm, normal volume, normal tone  Mood: anxious  Affect: mood-congruent  Thought Process: linear  Associations: intact  Thought Content and Perceptions: no suicidal ideation, no homicidal ideation, no auditory hallucinations, no visual hallucinations, no paranoid ideation, no ideas of reference, no evidence of delusions or psychosis  Recent and Remote Memory: recent memory intact, remote memory intact  Attention and Concentration: intact, attentive to conversation  Fund of Knowledge: intact, aware of current events, vocabulary appropriate  Insight: impaired  Judgment: impaired    Family History       Problem Relation (Age of Onset)    Cervical cancer Mother    Pancreatic cancer Maternal Grandmother          Tobacco Use    Smoking status: Some Days     Types: Cigarettes    Smokeless tobacco: Not on file   Substance and Sexual Activity    Alcohol use: Yes     Comment: rarely    Drug use: Yes     Types: Marijuana    Sexual activity: Yes     Partners: Male     Psychotherapeutics (From admission, onward)      Start     Stop Route Frequency Ordered    10/18/22 2100  traZODone tablet 100 mg         -- Oral Nightly 10/18/22 0823    10/17/22 2100  ziprasidone capsule 160 mg         -- Oral Nightly 10/17/22 1037    10/17/22 1145  venlafaxine 24 hr capsule 300 mg         -- Oral Daily 10/17/22 1037             Review of Systems  Objective:     Vital Signs (Most Recent):  Temp: 98 °F (36.7 °C) (10/19/22 0609)  Pulse: 79 (10/19/22 0609)  Resp: 18 (10/19/22 0609)  BP:  "91/62 (10/19/22 0609)  SpO2: 97 % (10/19/22 0609) Vital Signs (24h Range):  Temp:  [98 °F (36.7 °C)-98.4 °F (36.9 °C)] 98 °F (36.7 °C)  Pulse:  [64-79] 79  Resp:  [18] 18  SpO2:  [97 %-99 %] 97 %  BP: ()/(62-80) 91/62     Height: 5' 3" (160 cm)  Weight: 92.6 kg (204 lb 2.3 oz)  Body mass index is 36.16 kg/m².    No intake or output data in the 24 hours ending 10/19/22 1338    Physical Exam     Significant Labs: Last 72 Hours:   Recent Lab Results         10/18/22  0425        Cholesterol 253       Gluc Fast 92       HDL 64       LDL Cholesterol External 162.00       Syphilis Antibody Nonreactive       Total Cholesterol/HDL Ratio 4       Triglycerides 135       Very Low Density Lipoprotein 27               Significant Imaging: I have reviewed all pertinent imaging results/findings within the past 24 hours.  "

## 2022-10-19 NOTE — GROUP NOTE
Group Psychotherapy       Group Focus: Stress Management      Number of patients in attendance: 1    Group Start Time: 0900  Group End Time:  0945  Groups Date: 10/19/2022  Group Topic:  Behavioral Health  Group Department: [facility]  Group Facilitators:  Carlos Vela LCSW  _____________________________________________________________________    Patient Name: Kim Schroeder  MRN: [id number]  Patient Class: IP- Psych   Admission Date\Time: 10/17/2022  7:15 AM  Hospital Length of Stay: 2  Primary Care Provider: Roney Marcelino MD     Referred by: Behavioral Medicine Unit Treatment Team     Target symptoms: na     Patient's response to treatment: na     Progress toward goals: na     Interval History: Pt did not attend group     Diagnosis:      Plan: Continue treatment on BMU

## 2022-10-19 NOTE — NURSING
1:1 emotional support continued with Kim. She remains tearful. She is depressed, anxious, hopeless, sad, angry, and pessimistic. She feels powerless at present. She appears unkempt. Speech is pressured and rambling at times. (+) shower during previous shift today. She is preoccupied and inappropriately focused on D/C. Poor concentration. Denial. Ideas of reference. (+) appetite. (+) sleep. Encouragement given. (+) coping skills reinforced. Education provided. Q15 observations maintained for safety and precautions. She denied SI and contracted for safety.

## 2022-10-19 NOTE — NURSING
Kim is reclusive, withdrawn and in bed this morning.  She did not eat breakfast;  did take her morning meds without difficulty.  She avoids eye contact and offers no disclosure.  Her affect is flat at times and restricted at times.  She is drowsy and has low energy.  Keeps eyes closed and gives no feedback during this morning's assessment.   She acknowledges depressed mood when asked.  Does not give any response when asked if she feels suicidal.  Encouragement provided for interaction today and expression of feelings.  Q 15 min checks continue for safety and SP.

## 2022-10-20 VITALS
OXYGEN SATURATION: 98 % | BODY MASS INDEX: 36.17 KG/M2 | TEMPERATURE: 98 F | SYSTOLIC BLOOD PRESSURE: 134 MMHG | HEIGHT: 63 IN | HEART RATE: 92 BPM | RESPIRATION RATE: 18 BRPM | WEIGHT: 204.13 LBS | DIASTOLIC BLOOD PRESSURE: 91 MMHG

## 2022-10-20 PROCEDURE — 25000003 PHARM REV CODE 250: Performed by: NURSE PRACTITIONER

## 2022-10-20 PROCEDURE — S4991 NICOTINE PATCH NONLEGEND: HCPCS | Performed by: NURSE PRACTITIONER

## 2022-10-20 RX ORDER — ALBUTEROL SULFATE 90 UG/1
2 AEROSOL, METERED RESPIRATORY (INHALATION) EVERY 6 HOURS PRN
Qty: 18 G | Refills: 0 | Status: SHIPPED | OUTPATIENT
Start: 2022-10-20 | End: 2023-10-20

## 2022-10-20 RX ORDER — CLONAZEPAM 1 MG/1
1 TABLET ORAL 2 TIMES DAILY
Qty: 60 TABLET | Refills: 0 | Status: SHIPPED | OUTPATIENT
Start: 2022-10-20 | End: 2023-05-15

## 2022-10-20 RX ORDER — SUMATRIPTAN SUCCINATE 100 MG/1
100 TABLET ORAL 2 TIMES DAILY PRN
Qty: 30 TABLET | Refills: 0 | Status: SHIPPED | OUTPATIENT
Start: 2022-10-20 | End: 2022-11-19

## 2022-10-20 RX ORDER — TRAZODONE HYDROCHLORIDE 100 MG/1
100 TABLET ORAL NIGHTLY
Qty: 30 TABLET | Refills: 11 | Status: SHIPPED | OUTPATIENT
Start: 2022-10-20 | End: 2023-10-20

## 2022-10-20 RX ORDER — ZIPRASIDONE HYDROCHLORIDE 80 MG/1
160 CAPSULE ORAL NIGHTLY
Qty: 60 CAPSULE | Refills: 0 | Status: SHIPPED | OUTPATIENT
Start: 2022-10-20 | End: 2023-10-20

## 2022-10-20 RX ORDER — GABAPENTIN 300 MG/1
600 CAPSULE ORAL 3 TIMES DAILY
Qty: 180 CAPSULE | Refills: 0 | Status: SHIPPED | OUTPATIENT
Start: 2022-10-20 | End: 2023-10-20

## 2022-10-20 RX ORDER — VENLAFAXINE HYDROCHLORIDE 150 MG/1
300 CAPSULE, EXTENDED RELEASE ORAL DAILY
Qty: 60 CAPSULE | Refills: 0 | Status: SHIPPED | OUTPATIENT
Start: 2022-10-21 | End: 2023-10-21

## 2022-10-20 RX ADMIN — VENLAFAXINE HYDROCHLORIDE 300 MG: 150 CAPSULE, EXTENDED RELEASE ORAL at 08:10

## 2022-10-20 RX ADMIN — CLONAZEPAM 1 MG: 1 TABLET ORAL at 12:10

## 2022-10-20 RX ADMIN — GABAPENTIN 600 MG: 300 CAPSULE ORAL at 08:10

## 2022-10-20 RX ADMIN — NICOTINE 1 PATCH: 21 PATCH, EXTENDED RELEASE TRANSDERMAL at 08:10

## 2022-10-20 NOTE — NURSING
Kim is sitting in the group room. Blunted, labile. Anxious, depressed mood. Preoccupied,  and becomes tearful when talking about her  and children. She avoids social contact. She is fearful and guarded and concerned of where she will go after discharge. Q15min safety checks. Suicide precautions

## 2022-10-20 NOTE — NURSING
Awake and alert discharge orders noted and instructions given and understands, no H/I or S/I noted . All belongings returned, transportation per family. Discharge home

## 2022-10-20 NOTE — PLAN OF CARE
Will continue with care plan. She remains depressed regarding situation with  and children. Her  has left the home and taken the children with him. She denies SI and continues to state taking a handful of pills was to get her husbands attention.

## 2022-10-20 NOTE — PROGRESS NOTES
"10/20/2022 8:33 AM   Kim Schroeder   1990   [id number]        Psychiatry Progress Note     SUBJECTIVE:   Kim Schroeder is a 32 y.o. female admitted for an alleged attempted suicide. She has done well with the decrease in medications. She no longer feels like "a zombie" and reports feeling much better. She denies any suicidal or homicidal ideations. She has handled the stressors of her situation without acting out and reports that her mindset is better and that she is much more hopeful. I did have the therapist speak with her  to inform him that the medication combination may have had something to do with her erratic emotions as it was an excessive amount. He was thankful for the information. She still is planning to go to her mother's home and to give her  any space that he may need. If he does want her to return home she will gladly do so. We will discharge today with all follow up recommendations in place.        Current Medications:   Scheduled Meds:    clonazePAM  1 mg Oral BID    gabapentin  600 mg Oral TID    nicotine  1 patch Transdermal Daily    traZODone  100 mg Oral QHS    venlafaxine  300 mg Oral Daily    ziprasidone  160 mg Oral QHS      PRN Meds: acetaminophen, albuterol, aluminum-magnesium hydroxide-simethicone, hydrOXYzine pamoate, ibuprofen, magnesium hydroxide 400 mg/5 ml, sumatriptan   Psychotherapeutics (From admission, onward)      Start     Stop Route Frequency Ordered    10/18/22 2100  traZODone tablet 100 mg         -- Oral Nightly 10/18/22 0823    10/17/22 2100  ziprasidone capsule 160 mg         -- Oral Nightly 10/17/22 1037    10/17/22 1145  venlafaxine 24 hr capsule 300 mg         -- Oral Daily 10/17/22 1037            Allergies:   Review of patient's allergies indicates:  No Known Allergies     OBJECTIVE:   Vitals   Vitals:    10/20/22 0602   BP: (!) 134/91   Pulse: 92   Resp: 18   Temp: 97.9 °F (36.6 °C)        Labs/Imaging/Studies:   No results " "found for this or any previous visit (from the past 36 hour(s)).       Medical Review Of Systems:  A comprehensive review of systems was negative.      Psychiatric Mental Status Exam:  General Appearance: dressed in casual attire  Arousal: alert  Behavior: normal; cooperative; reasonably friendly, pleasant, and polite; appropriate eye-contact; under good behavioral control  Movements and Motor Activity: no abnormal involuntary movements noted; no tics, no tremors, no akathisia, no dystonia, no evidence of tardive dyskinesia; no psychomotor agitation or retardation  Orientation: oriented to person, place, time, and situation  Speech: normal rate, rhythm, volume, tone and pitch  Mood: "much improved"  Affect: mood-congruent  Thought Process: goal-directed  Associations: no loosening of associations  Thought Content and Perceptions: no suicidal or homicidal ideation, no auditory or visual hallucinations, no paranoid ideation, no ideas of reference, no evidence of delusions or psychosis  Recent and Remote Memory: intact  Attention and Concentration: intact  Fund of Knowledge: intact  Insight: adequate  Judgment:  improved    ASSESSMENT/PLAN:   Diagnosis:  MOOD DISORDERS; Bipolar I Disorder, Most Recent Episode Depressed: Severe Without Psychotic Features (F31.4)       Past Medical History:   Diagnosis Date    Anxiety     Bipolar 1 disorder     Depression     PTSD (post-traumatic stress disorder)     Schizophrenia         Plan:  Discharge back to home today  Follow up with Pamela Rose PMHNP  Recommend individual therapy    Expected Disposition Plan: Home        Xavier NOWAK PMHNP  Psychiatry  [facility]  "

## 2022-10-21 NOTE — DISCHARGE SUMMARY
"[facility]  Psychiatry  Discharge Summary      Patient Name: Kim Schroeder  MRN: [id number]  Admission Date: 10/17/2022  Hospital Length of Stay: 3 days  Discharge Date and Time: 10/20/2022  1:35 PM  Attending Physician: No att. providers found   Discharging Provider: Blayne Gamble MD  Primary Care Provider: Roney Marcelino MD    HPI:   Kim Schroeder is a 32 y.o. female placed under a PEC at [facility]. She was reportedly OPC'd by her  for "swallowing a handful of pills" (unknown amount) in a possible suicide attempt. He did say that she spit them out but was unsure how many she actually swallowed. Upon interview she stated that she was extremely upset over being placed in a hospital. She stated that she was not suicidal and that she was "trying to get my 's attention". She cried and stated "I can dance naked in front of the tv and he would just tell me to move". Reports that her  almost  from Covid pneumonia one year ago and after getting off of the vent he was left with drop foot and a paralyzed arm. He has been unable to return to work and "sits around all day doing absolutely nothing". Claims that she is in college online and tends to her three children as well as the house. She does have a history of bipolar disorder and is medication compliant. She sees Pamela Rose monthly as well as her individual therapist. She denies ever trying to hurt herself before and claims that she was not trying to harm herself this time either. She did verbalize feeling "like a zombie" with her current medication regimen and is forced to take "Adderall every morning just to start moving". After review of medications, I will make appropriate adjustments. She denies any psychosis. Her appetite and sleep patterns are stable. We will admit for medication management and for the benefit of psychotherapy sessions.       Hospital " "Course:   She was admitted to the psych unit and monitored for safety. Her meds were reconciled and adjusted. Klonopin was decreased, seroquel and trileptal were stopped and trazodone was started. She was provided with therapy.    10/18/22-She was admitted for reportedly attempting to swallow pills in a suicide attempt. She sees a NP for psych meds and is on Klonopin, Gabapentin, Adderall and reportedly gets medical marijuana. Her overdose risk score is 740 and risk of her prescribed meds was reviewed with patient. "It was a joke that went real bad, he thought I did take them." She says she pretended to take a handful of pills as a joke and her  did not realize it was a joke. Says she spit the pills back into the cup. She denies feeling depressed and has poor insight. She admits to stress caring for 3 children and her  who is on disability.  is not talking to her currently but she says she can return home after discharge. She denies si/hi. Denies psychosis. Tolerating meds currently and on a lower dose of Klonopin. BP has been running low and she is also on propranolol. Slept a lot here last night and tired today. Will decrease trazodone and propranolol due to fatigue and low BP and monitor closely on meds. Encourage group attendence.    10/19/22- told her he left and took the kids and was going to get a restraining on her.  has not been answering the phone. She was emotional about this yesterday. She isolates in her room. She wants to go home but is not coping well with her stressors. She has poor impulse control and takes little responsibilities for her actions. Slept well last night and was sluggish this morning. "I sleep all day" and does not attend groups. She is resistant to care and minimizes her problems. No side effects notes but her BP has been low, systolic BP in the 90s. No withdrawals. Discontinue propranolol due to low BP as it was given for migraines. Monitor on " "other meds despite polypharmacy. Will not give klonopin script upon discharge given she was prescribed it at home and filled 2mg pills on 9/23/22.    10/20/22-She has done well with the decrease in medications. She no longer feels like "a zombie" and reports feeling much better. She denies any suicidal or homicidal ideations. She has handled the stressors of her situation without acting out and reports that her mindset is better and that she is much more hopeful. I did have the therapist speak with her  to inform him that the medication combination may have had something to do with her erratic emotions as it was an excessive amount. He was thankful for the information. She still is planning to go to her mother's home and to give her  any space that he may need. If he does want her to return home she will gladly do so. We will discharge today with all follow up recommendations in place.        Goals of Care Treatment Preferences:  Code Status: Full Code      * No surgery found *     Consults:   Physical Exam     Significant Labs:   Last 72 Hours:   Recent Lab Results     None          Significant Imaging: I have reviewed all pertinent imaging results/findings within the past 24 hours.    Smoking Cessation:   Does the patient smoke? Yes  Does the patient want a prescription for Smoking Cessation? No  Does the patient want counseling for Smoking Cessation? No         Pending Diagnostic Studies:     None        Final Active Diagnoses:    Diagnosis Date Noted POA    PRINCIPAL PROBLEM:  Bipolar affective disorder, current episode depressed [F31.30] 10/17/2022 Yes    Tobacco abuse [Z72.0] 10/17/2022 Yes      Problems Resolved During this Admission:      No new Assessment & Plan notes have been filed under this hospital service since the last note was generated.  Service: Psychiatry      Functional Condition: Independent ambulation    Discharged Condition: fair    Disposition: Home or Self Care    Follow " Up:   Follow-up Information     Pamela Rose Follow up on 10/24/2022.    Why: Pt's appointment is at 12:30  Contact information:  [address] [place] [address]  [place] [id number] [phone]                     Patient Instructions:   No discharge procedures on file.  Medications:  Reconciled Home Medications:      Medication List      START taking these medications    gabapentin 300 MG capsule  Commonly known as: NEURONTIN  Take 2 capsules (600 mg total) by mouth 3 (three) times daily.  Replaces: gabapentin 600 MG tablet     traZODone 100 MG tablet  Commonly known as: DESYREL  Take 1 tablet (100 mg total) by mouth every evening.        CHANGE how you take these medications    albuterol 90 mcg/actuation inhaler  Commonly known as: PROVENTIL/VENTOLIN HFA  Inhale 2 puffs into the lungs every 6 (six) hours as needed for Wheezing. Rescue  What changed: See the new instructions.  Notes to patient: Take as needed     clonazePAM 1 MG tablet  Commonly known as: KlonoPIN  Take 1 tablet (1 mg total) by mouth 2 (two) times daily.  What changed:   · medication strength  · how much to take     sumatriptan 100 MG tablet  Commonly known as: IMITREX  Take 1 tablet (100 mg total) by mouth 2 (two) times daily as needed for Migraine.  What changed: reasons to take this  Notes to patient: Take as needed     venlafaxine 150 MG Cp24  Commonly known as: EFFEXOR-XR  Take 2 capsules (300 mg total) by mouth once daily.  What changed: how to take this        CONTINUE taking these medications    ziprasidone 80 MG capsule  Commonly known as: GEODON  Take 2 capsules (160 mg total) by mouth every evening.        STOP taking these medications    DULERA 200-5 mcg/actuation inhaler  Generic drug: mometasone-formoterol     eszopiclone 3 mg Tab  Commonly known as: LUNESTA     gabapentin 600 MG tablet  Commonly known as: NEURONTIN  Replaced by: gabapentin 300 MG capsule     propranoloL 40 MG tablet  Commonly known as: INDERAL          Is patient being  discharged on multiple antipsychotics? No        Total time:15 with greater than 50% of this time spent in counseling and/or coordination of care.     All elements of the transition record were discussed with the patient at discharge and patient agrees to this plan.    Blayne Gamble MD  Psychiatry  [facility]

## 2023-04-05 ENCOUNTER — TELEPHONE (OUTPATIENT)
Dept: OBSTETRICS AND GYNECOLOGY | Facility: CLINIC | Age: 33
End: 2023-04-05
Payer: MEDICARE

## 2023-04-05 NOTE — TELEPHONE ENCOUNTER
----- Message from Nasima Gong sent at 4/5/2023  8:58 AM CDT -----  Contact: Pt  Wanting to know if she has a bladder infection can she pass that on to her partner and can be reached at [phone]

## 2023-04-05 NOTE — TELEPHONE ENCOUNTER
I called pt back and informed her that you can not pass a bladder infection to her partner. Pt verbalized understanding.

## 2023-05-10 NOTE — GROUP NOTE
Group Psychotherapy       Group Focus: Stress Management      Number of patients in attendance: 5    Group Start Time: 1945  Group End Time:  2030  Groups Date: 10/19/2022  Group Topic:  Behavioral Health  Group Department: [facility]  Group Facilitators:  Kath Fisher RN  _____________________________________________________________________    Patient Name: Kim Schroeder  MRN: [id number]  Patient Class: IP- Psych   Admission Date\Time: 10/17/2022  7:15 AM  Hospital Length of Stay: 2  Primary Care Provider: Roney Marcelino MD     Referred by: Behavioral Medicine Unit Treatment Team     Target symptoms: Depression, Mood Disorder, and stress     Patient's response to treatment: Active Listening     Progress toward goals: Progressing slowly     Interval History: attended and participated in group, accepted Wellness wheel handout     Diagnosis: Bipolar affective disorder, current episode depressed     Plan: Continue treatment on BMU     Please inform the patient the following about their results:        Liver enzymes are within the normal range.  Continue to cut back on alcohol so they do not elevate.    Willard Rapp MD  Family Medicine  5/10/2023  9:54 AM

## 2023-05-15 ENCOUNTER — OFFICE VISIT (OUTPATIENT)
Dept: OBSTETRICS AND GYNECOLOGY | Facility: CLINIC | Age: 33
End: 2023-05-15
Payer: MEDICARE

## 2023-05-15 VITALS
WEIGHT: 179 LBS | DIASTOLIC BLOOD PRESSURE: 74 MMHG | BODY MASS INDEX: 31.71 KG/M2 | SYSTOLIC BLOOD PRESSURE: 114 MMHG | HEART RATE: 94 BPM

## 2023-05-15 DIAGNOSIS — Z72.0 TOBACCO ABUSE: ICD-10-CM

## 2023-05-15 DIAGNOSIS — Z20.2 POSSIBLE EXPOSURE TO STD: Primary | ICD-10-CM

## 2023-05-15 PROCEDURE — 99214 OFFICE O/P EST MOD 30 MIN: CPT | Mod: S$GLB,,, | Performed by: OBSTETRICS & GYNECOLOGY

## 2023-05-15 PROCEDURE — 99214 PR OFFICE/OUTPT VISIT, EST, LEVL IV, 30-39 MIN: ICD-10-PCS | Mod: S$GLB,,, | Performed by: OBSTETRICS & GYNECOLOGY

## 2023-05-15 RX ORDER — ESTRADIOL 0.1 MG/G
CREAM VAGINAL
COMMUNITY
Start: 2023-03-30

## 2023-05-15 RX ORDER — ESZOPICLONE 1 MG/1
3 TABLET, FILM COATED ORAL
COMMUNITY

## 2023-05-15 RX ORDER — PROPRANOLOL HYDROCHLORIDE 40 MG/1
TABLET ORAL
COMMUNITY
Start: 2023-05-02

## 2023-05-15 RX ORDER — CLONIDINE HYDROCHLORIDE 0.1 MG/1
TABLET ORAL
COMMUNITY
Start: 2023-03-30

## 2023-05-15 RX ORDER — DOXEPIN HYDROCHLORIDE 10 MG/1
10 CAPSULE ORAL
COMMUNITY

## 2023-05-15 RX ORDER — BUTALBITAL, ACETAMINOPHEN AND CAFFEINE 300; 40; 50 MG/1; MG/1; MG/1
CAPSULE ORAL
COMMUNITY
Start: 2023-03-30

## 2023-05-15 RX ORDER — DEXTROAMPHETAMINE SACCHARATE, AMPHETAMINE ASPARTATE, DEXTROAMPHETAMINE SULFATE AND AMPHETAMINE SULFATE 7.5; 7.5; 7.5; 7.5 MG/1; MG/1; MG/1; MG/1
TABLET ORAL
COMMUNITY
Start: 2023-04-17

## 2023-05-15 NOTE — PROGRESS NOTES
Subjective:       Patient ID: Kim Schroeder is a 33 y.o. female.    Chief Complaint:  Consult      History of Present Illness  Pt here for consult for abnormal Pap.  History and past labs reviewed with patient.    Complaints of having recurrent abnormal Paps with history of a LEEP in .  Patient has had a supracervical hysterectomy during .  She is concerned and would like her have her cervix removed.  She is also reporting pelvic pain bilaterally in her lower abdomen        Review of Systems  Review of Systems   Constitutional:  Negative for chills and fever.   Respiratory:  Negative for shortness of breath.    Cardiovascular:  Negative for chest pain.   Gastrointestinal:  Negative for abdominal pain, blood in stool, constipation, diarrhea, nausea, vomiting and reflux.   Genitourinary:  Positive for pelvic pain. Negative for dysmenorrhea, dyspareunia, dysuria, hematuria, hot flashes, vaginal bleeding, vaginal discharge, postcoital bleeding and vaginal dryness.   Musculoskeletal:  Negative for arthralgias and joint swelling.   Integumentary:  Negative for rash, hair changes, breast mass, nipple discharge and breast skin changes.   Psychiatric/Behavioral:  Negative for depression. The patient is not nervous/anxious.    Breast: Negative for asymmetry, lump, mass, nipple discharge and skin changes        Objective:     Vitals:    05/15/23 1349   BP: 114/74   Pulse: 94   Weight: 81.2 kg (179 lb)       Physical Exam:   Constitutional: She appears well-developed and well-nourished. No distress.    HENT:   Head: Normocephalic and atraumatic.    Eyes: Conjunctivae and EOM are normal.    Neck: No tracheal deviation present. No thyromegaly present.    Cardiovascular:       Exam reveals no clubbing, no cyanosis and no edema.        Pulmonary/Chest: Effort normal. No respiratory distress.        Abdominal: Soft. She exhibits no distension and no mass. There is no abdominal tenderness. There is no  rebound and no guarding. No hernia.     Genitourinary:    Vagina, uterus and rectum normal.      Pelvic exam was performed with patient supine.   There is no rash, tenderness, lesion or injury on the right labia. There is no rash, tenderness, lesion or injury on the left labia. Cervix is normal. Right adnexum displays no mass, no tenderness and no fullness. Left adnexum displays no mass, no tenderness and no fullness.                Skin: She is not diaphoretic. No cyanosis. Nails show no clubbing.        Assessment:        1. Possible exposure to STD    2. Tobacco abuse                Plan:      Reviewed previous Pap smears  2021 HSIL cervical biopsy  2023 L BETSY positive HPV  STD panel collected secondary to pelvic pain  Pelvic ultrasound for measurement of cervical stump  Discussed smoking cessation and its affect on abnormal Pap smears  Referral to smoking cessation clinic sent  Following ultrasound will discussed with patient was she is a candidate for trachelectomy   Return to clinic following ultrasound

## 2023-05-30 ENCOUNTER — TELEPHONE (OUTPATIENT)
Dept: SMOKING CESSATION | Facility: CLINIC | Age: 33
End: 2023-05-30
Payer: MEDICARE

## 2023-05-30 NOTE — TELEPHONE ENCOUNTER
Spoke with patient regarding rescheduling missed [facility] appointment.  Patient stated that she not not interested in rescheduling her appointment at this time.  Patient will contact counselor when she's ready to reschedule.

## 2023-06-06 ENCOUNTER — TELEPHONE (OUTPATIENT)
Dept: OBSTETRICS AND GYNECOLOGY | Facility: CLINIC | Age: 33
End: 2023-06-06
Payer: MEDICARE

## 2023-06-06 NOTE — TELEPHONE ENCOUNTER
Spoke with patient.  I informed her that Dr. Shelley did not perform a pap smear at her last appointment, she only did STD testing. I did tell her that her results were negative for STD's. I then told her that Dr. Shelley had wanted her to have a pelvic ultrasound, so I scheduled that for the patient as well. She is scheduled for 6/12 at 10:30. I told her that after she has her ultrasound she needs to make an appointment to discuss her results with Dr. Shelley. Patient verbalized understanding.

## 2023-06-06 NOTE — TELEPHONE ENCOUNTER
----- Message from Diane Godfrey sent at 6/6/2023  1:39 PM CDT -----  Regarding: Results  Contact: patient  Type:  Test Results    Who Called: Kim   Name of Test (Lab/Mammo/Etc):  pap smear    Date of Test: 05/15/2023  Ordering Provider:  Dr Natividad Shelley   Where the test was performed:  in the office   Would the patient rather a call back or a response via [facility]?  Call back   Best Call Back Number:  [phone] (home) or [phone] (cell) ask mora Hernandez    Additional Information:      Thanks,  SJ

## 2023-06-07 DIAGNOSIS — R10.2 PELVIC PAIN: Primary | ICD-10-CM

## 2023-06-12 ENCOUNTER — PROCEDURE VISIT (OUTPATIENT)
Dept: OBSTETRICS AND GYNECOLOGY | Facility: CLINIC | Age: 33
End: 2023-06-12
Payer: MEDICARE

## 2023-06-12 DIAGNOSIS — R10.2 PELVIC PAIN: ICD-10-CM

## 2023-06-12 PROCEDURE — 76830 US OB/GYN PROCEDURE (VIEWPOINT): ICD-10-PCS | Mod: S$GLB,,, | Performed by: OBSTETRICS & GYNECOLOGY

## 2023-06-12 PROCEDURE — 76830 TRANSVAGINAL US NON-OB: CPT | Mod: S$GLB,,, | Performed by: OBSTETRICS & GYNECOLOGY
